# Patient Record
(demographics unavailable — no encounter records)

---

## 2024-11-18 NOTE — ASSESSMENT
[FreeTextEntry1] : Annual physical: f/u routine labwork BMI 28: likely 2/2 conditioning, Recommend low fat diet, wt loss, exercise, and DASH diet. Skin tags/callus: refer to dermatology RTC 3 wks

## 2024-11-18 NOTE — HEALTH RISK ASSESSMENT
[Very Good] : ~his/her~  mood as very good [Yes] : Yes [2 - 4 times a month (2 pts)] : 2-4 times a month (2 points) [1 or 2 (0 pts)] : 1 or 2 (0 points) [Never (0 pts)] : Never (0 points) [No] : In the past 12 months have you used drugs other than those required for medical reasons? No [0] : 2) Feeling down, depressed, or hopeless: Not at all (0) [PHQ-2 Negative - No further assessment needed] : PHQ-2 Negative - No further assessment needed [Audit-CScore] : 2 [de-identified] : regularly [de-identified] : healthy [YES2Srgar] : 0 [Never] : Never [HIV test declined] : HIV test declined [Hepatitis C test declined] : Hepatitis C test declined [With Family] : lives with family [Employed] : employed [Single] : single [Sexually Active] : not sexually active [High Risk Behavior] : no high risk behavior [Feels Safe at Home] : Feels safe at home [Fully functional (bathing, dressing, toileting, transferring, walking, feeding)] : Fully functional (bathing, dressing, toileting, transferring, walking, feeding) [Fully functional (using the telephone, shopping, preparing meals, housekeeping, doing laundry, using] : Fully functional and needs no help or supervision to perform IADLs (using the telephone, shopping, preparing meals, housekeeping, doing laundry, using transportation, managing medications and managing finances) [Reports changes in hearing] : Reports no changes in hearing [Reports changes in vision] : Reports no changes in vision [Reports changes in dental health] : Reports no changes in dental health

## 2024-11-18 NOTE — PHYSICAL EXAM
[Normal] : affect was normal and insight and judgment were intact [de-identified] : callus, skin tags

## 2024-11-18 NOTE — HISTORY OF PRESENT ILLNESS
[FreeTextEntry1] : cpe [de-identified] : 23 yo male presents for annual physical. Reports feeling well. He does report callouses and skin tags on hands. Denies fever, chills, cp, palpitations, sob, nvcd.

## 2025-01-10 NOTE — ASSESSMENT
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

## 2025-01-10 NOTE — HISTORY OF PRESENT ILLNESS
[Home] : at home, [unfilled] , at the time of the visit. [Medical Office: ([facility])___] : at the medical office located in  [Verbal consent obtained from patient] : the patient, [unfilled] [FreeTextEntry1] : follow up [de-identified] : 23 yo male presents for follow up of hyperbilirubinemia, eosinophilia, low HDL. reports feeling well. Denies fever, chills, cp, palpitations, sob, nvcd, jaundice. He reports that he had 3 days of rectal bleeding, this occurred a week ago. He has had no further episodes. Blood was in toilet. Denies fever, chills, cp, palpitations, sob.

## 2025-01-10 NOTE — REVIEW OF SYSTEMS
[Abdominal Pain] : no abdominal pain [Constipation] : no constipation [Diarrhea] : no diarrhea [Vomiting] : no vomiting [Negative] : Psychiatric [FreeTextEntry7] : rectal bleeding